# Patient Record
Sex: MALE | Race: WHITE
[De-identification: names, ages, dates, MRNs, and addresses within clinical notes are randomized per-mention and may not be internally consistent; named-entity substitution may affect disease eponyms.]

---

## 2018-07-14 ENCOUNTER — HOSPITAL ENCOUNTER (EMERGENCY)
Dept: HOSPITAL 97 - ER | Age: 48
Discharge: HOME | End: 2018-07-14
Payer: COMMERCIAL

## 2018-07-14 VITALS — TEMPERATURE: 98.8 F | OXYGEN SATURATION: 98 %

## 2018-07-14 VITALS — DIASTOLIC BLOOD PRESSURE: 68 MMHG | SYSTOLIC BLOOD PRESSURE: 103 MMHG

## 2018-07-14 DIAGNOSIS — Z88.0: ICD-10-CM

## 2018-07-14 DIAGNOSIS — F41.9: ICD-10-CM

## 2018-07-14 DIAGNOSIS — F32.9: ICD-10-CM
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
## 2018-07-14 DIAGNOSIS — S61.512A: Primary | ICD-10-CM

## 2018-07-14 DIAGNOSIS — F17.210: ICD-10-CM

## 2018-07-14 PROCEDURE — 0JQH0ZZ REPAIR LEFT LOWER ARM SUBCUTANEOUS TISSUE AND FASCIA, OPEN APPROACH: ICD-10-PCS

## 2018-07-14 PROCEDURE — 99284 EMERGENCY DEPT VISIT MOD MDM: CPT
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
## 2018-07-14 NOTE — RAD REPORT
EXAM DESCRIPTION:  RAD - Wrist Left 3 View - 7/14/2018 2:42 am

 

CLINICAL HISTORY:  Fall, laceration

 

COMPARISON:  None.

 

FINDINGS:  No fracture is identified. There is no dislocation or periosteal reaction noted. No acute 
bone or joint finding identifiable. Faint calcific densities are present on the AP and oblique views 
superimposed in the triangular fibrocartilage region. There is faint radiopaque density in the ventra
l soft tissues on the lateral view in this same location. Small foreign body cannot be excluded. Jon
elation is needed for any evidence of a soft tissue injury at the base of the hand ulna side.

 

IMPRESSION:  No fracture or acute bone or joint finding.

 

Faint radiopaque density soft tissues ventral surface near the ulna carpal bone articulation. Correla
tion is needed with any soft tissue wound in this region.

## 2018-07-22 ENCOUNTER — HOSPITAL ENCOUNTER (EMERGENCY)
Dept: HOSPITAL 97 - ER | Age: 48
Discharge: HOME | End: 2018-07-22
Payer: COMMERCIAL

## 2018-07-22 VITALS — OXYGEN SATURATION: 96 % | SYSTOLIC BLOOD PRESSURE: 104 MMHG | DIASTOLIC BLOOD PRESSURE: 73 MMHG

## 2018-07-22 VITALS — TEMPERATURE: 97.9 F

## 2018-07-22 DIAGNOSIS — R07.9: Primary | ICD-10-CM

## 2018-07-22 DIAGNOSIS — F32.9: ICD-10-CM

## 2018-07-22 DIAGNOSIS — F41.9: ICD-10-CM

## 2018-07-22 DIAGNOSIS — F17.210: ICD-10-CM

## 2018-07-22 DIAGNOSIS — Z88.0: ICD-10-CM

## 2018-07-22 LAB
ALBUMIN SERPL BCP-MCNC: 3.8 G/DL (ref 3.4–5)
ALP SERPL-CCNC: 80 U/L (ref 45–117)
ALT SERPL W P-5'-P-CCNC: 25 U/L (ref 12–78)
AST SERPL W P-5'-P-CCNC: 20 U/L (ref 15–37)
BUN BLD-MCNC: 12 MG/DL (ref 7–18)
CKMB CREATINE KINASE MB: 1 NG/ML (ref 0.3–3.6)
GLUCOSE SERPLBLD-MCNC: 89 MG/DL (ref 74–106)
HCT VFR BLD CALC: 40.8 % (ref 39.6–49)
INR BLD: 0.94
LYMPHOCYTES # SPEC AUTO: 4 K/UL (ref 0.7–4.9)
MAGNESIUM SERPL-MCNC: 2.2 MG/DL (ref 1.8–2.4)
MCH RBC QN AUTO: 32.5 PG (ref 27–35)
MCV RBC: 94.4 FL (ref 80–100)
NT-PROBNP SERPL-MCNC: 115 PG/ML (ref ?–125)
PMV BLD: 8.5 FL (ref 7.6–11.3)
POTASSIUM SERPL-SCNC: 4.2 MMOL/L (ref 3.5–5.1)
RBC # BLD: 4.32 M/UL (ref 4.33–5.43)

## 2018-07-22 PROCEDURE — 71045 X-RAY EXAM CHEST 1 VIEW: CPT

## 2018-07-22 PROCEDURE — 93005 ELECTROCARDIOGRAM TRACING: CPT

## 2018-07-22 PROCEDURE — 82550 ASSAY OF CK (CPK): CPT

## 2018-07-22 PROCEDURE — 36415 COLL VENOUS BLD VENIPUNCTURE: CPT

## 2018-07-22 PROCEDURE — 80048 BASIC METABOLIC PNL TOTAL CA: CPT

## 2018-07-22 PROCEDURE — 84484 ASSAY OF TROPONIN QUANT: CPT

## 2018-07-22 PROCEDURE — 83735 ASSAY OF MAGNESIUM: CPT

## 2018-07-22 PROCEDURE — 85610 PROTHROMBIN TIME: CPT

## 2018-07-22 PROCEDURE — 85025 COMPLETE CBC W/AUTO DIFF WBC: CPT

## 2018-07-22 PROCEDURE — 99285 EMERGENCY DEPT VISIT HI MDM: CPT

## 2018-07-22 PROCEDURE — 80076 HEPATIC FUNCTION PANEL: CPT

## 2018-07-22 PROCEDURE — 83880 ASSAY OF NATRIURETIC PEPTIDE: CPT

## 2018-07-22 PROCEDURE — 85730 THROMBOPLASTIN TIME PARTIAL: CPT

## 2018-07-22 PROCEDURE — 82553 CREATINE MB FRACTION: CPT

## 2018-07-22 PROCEDURE — 81003 URINALYSIS AUTO W/O SCOPE: CPT

## 2018-07-22 NOTE — RAD REPORT
EXAM DESCRIPTION:  RAD - Chest Single View - 7/22/2018 2:06 am

 

CLINICAL HISTORY:  Chest pain

 

COMPARISON:  March 2017, December 2016, April 2014

 

TECHNIQUE:  AP portable chest image was obtained 0150 hours .

 

FINDINGS:  Lungs are clear of an acute process. Interstitial pattern is stable. Trachea is midline. H
eart size normal range with no vascular engorgement. No measurable pleural effusion and no pneumothor
ax. Bony degenerative changes are present. In the left humeral epiphysis and metaphysis region there 
is central lucent lesion with sclerotic rim. This is not fully assessed on this study. No gross ace
e from prior imaging. No acute aortic findings suspected.

 

IMPRESSION:  No acute cardiopulmonary process.

 

The above detailed findings are not clearly different from comparison studies.

## 2018-07-22 NOTE — EDPHYS
Physician Documentation                                                                           

 [facility]                                                                

Name: Darrell Ruiz                                                                               

Age: 47 yrs                                                                                       

Sex: Male                                                                                         

: 1970                                                                                   

MRN: [id number]                                                                                   

Arrival Date: 2018                                                                          

Time: 01:15                                                                                       

Account#: [id number]                                                                            

Bed 23                                                                                            

Private MD:                                                                                       

ED Physician Norman Navas                                                                             

HPI:                                                                                              

                                                                                             

02:00 This 47 yrs old  Male presents to ER via Ambulatory with complaints of         pm1 

      Palpitations, chest pain.                                                                   

02:00 The patient presents with a history of irregular heart beat. Context: The symptoms      pm1 

      occur with anxiety, with stress. Onset: The symptoms/episode began/occurred Has had         

      chest pain and palpitations for about 5 years. Managed by PCP with Xanax. Had episode       

      of palpations 3 hours ago. Modifying factors: The symptoms are aggravated by anxiety,       

      stress, The symptoms are alleviated by prescription medication, Xanax and relaxation.       

      Associated signs and symptoms: Pertinent negatives: cough, fever, nausea, SOB,              

      vomiting. Severity of symptoms: in the emergency department the symptoms have resolved.     

      The patient has experienced similar episodes in the past, multiple times, chronically.      

02:00 Patient was concerned that his laceration repair might be getting infected. No          pm1 

      drainage, dehiscence, redness from wound repair to left hand.                               

                                                                                                  

Historical:                                                                                       

- Allergies:                                                                                      

01:29 PENICILLINS;                                                                            tl2 

- Home Meds:                                                                                      

01:29 alprazolam 2 mg Oral tab as needed [Active]; Ambien 10 mg Oral tab 1 tab once daily     tl2 

      [Active]; Duexis 800-26.6 mg Oral tab 1 tab 3 times per day [Active]; Norco  mg       

      Oral tab 1 tab every 6 hours for Pain [Active]; Phenergan Oral 25 mg as needed [Active];    

- PMHx:                                                                                           

01:29 Anxiety; Back pain; carpal tunnel; chronic back pain; Chronic pain; DEGENERATIVE DISC   tl2 

      DX; Depression; insomnia; osteoarthritis;                                                   

                                                                                                  

- Immunization history:: Adult Immunizations up to date.                                          

- Social history:: Smoking status: Patient uses tobacco products, smokes one pack                 

  cigarettes per day.                                                                             

- Ebola Screening: : No symptoms or risks identified at this time.                                

                                                                                                  

                                                                                                  

ROS:                                                                                              

02:00 Constitutional: Negative for fever, chills, and weight loss, Eyes: Negative for injury, pm1 

      pain, redness, and discharge, ENT: Negative for injury, pain, and discharge, Neck:          

      Negative for injury, pain, and swelling, Respiratory: Negative for shortness of breath,     

      cough, wheezing, and pleuritic chest pain, Abdomen/GI: Negative for abdominal pain,         

      nausea, vomiting, diarrhea, and constipation.                                               

02:00 Back: Negative for injury and pain, : Negative for injury, bleeding, discharge, and       

      swelling, MS/Extremity: Negative for injury and deformity, Skin: Negative for injury,       

      rash, and discoloration, Neuro: Negative for headache, weakness, numbness, tingling,        

      and seizure.                                                                                

02:00 Cardiovascular: Positive for chest pain, palpitations.                                      

                                                                                                  

Exam:                                                                                             

02:00 Constitutional:  This is a well developed, well nourished patient who is awake, alert,  pm1 

      and in no acute distress. Head/Face:  Normocephalic, atraumatic. Eyes:  Pupils equal        

      round and reactive to light, extra-ocular motions intact.  Lids and lashes normal.          

      Conjunctiva and sclera are non-icteric and not injected.  Cornea within normal limits.      

      Periorbital areas with no swelling, redness, or edema. ENT:  Nares patent. No nasal         

      discharge, no septal abnormalities noted.  Tympanic membranes are normal and external       

      auditory canals are clear.  Oropharynx with no redness, swelling, or masses, exudates,      

      or evidence of obstruction, uvula midline.  Mucous membranes moist. Neck:  Trachea          

      midline, no thyromegaly or masses palpated, and no cervical lymphadenopathy.  Supple,       

      full range of motion without nuchal rigidity, or vertebral point tenderness.  No            

      Meningismus. Chest/axilla:  Normal chest wall appearance and motion.  Nontender with no     

      deformity.  No lesions are appreciated. Cardiovascular:  Regular rate and rhythm with a     

      normal S1 and S2.  No gallops, murmurs, or rubs.  Normal PMI, no JVD.  No pulse             

      deficits. Respiratory:  Lungs have equal breath sounds bilaterally, clear to                

      auscultation and percussion.  No rales, rhonchi or wheezes noted.  No increased work of     

      breathing, no retractions or nasal flaring. Abdomen/GI:  Soft, non-tender, with normal      

      bowel sounds.  No distension or tympany.  No guarding or rebound.  No evidence of           

      tenderness throughout. Back:  No spinal tenderness.  No costovertebral tenderness.          

      Full range of motion. MS/ Extremity:  Pulses equal, no cyanosis.  Neurovascular intact.     

       Full, normal range of motion.                                                              

02:00 Skin: Appearance: normal except for affected area, Wound recheck: Suture laceration         

      closure: the wound is healing well, the edges are well approximated, no evidence of         

      dehiscence, no drainage, no erythema, no swelling.                                          

02:00 Neuro: Orientation: is normal, Motor: is normal, moves all fours.                           

                                                                                                  

Vital Signs:                                                                                      

01:29  / 83; Pulse 55; Resp 18; Temp 97.9(O); Pulse Ox 98% on R/A; Weight 68.95 kg;     tl2 

      Height 5 ft. 6 in. (167.64 cm); Pain 10/10;                                                 

02:25  / 73; Pulse 57; Resp 18; Pulse Ox 96% on R/A; Pain 4/10;                         mg2 

01:29 Body Mass Index 24.53 (68.95 kg, 167.64 cm)                                             tl2 

                                                                                                  

MDM:                                                                                              

01:30 Patient medically screened.                                                             pm1 

02:46 Data reviewed: vital signs. Data interpreted: Pulse oximetry: on room air is 96 %.      pm1 

      Interpretation: normal. Counseling: I had a detailed discussion with the patient and/or     

      guardian regarding: the historical points, exam findings, and any diagnostic results        

      supporting the discharge/admit diagnosis, lab results, radiology results, the need for      

      outpatient follow up, to return to the emergency department if symptoms worsen or           

      persist or if there are any questions or concerns that arise at home.                       

                                                                                                  

                                                                                             

01:34 Order name: Basic Metabolic Panel; Complete Time: 02:29                                 pm                                                                                             

01:34 Order name: CBC with Diff; Complete Time: 02:08                                         pm                                                                                             

01:34 Order name: Ckmb; Complete Time: 02:                                                                                             

01:34 Order name: CPK; Complete Time: 02:29                                                                                                                                                

01:34 Order name: LFT's; Complete Time: 02:29                                                 pm                                                                                             

01:34 Order name: Magnesium; Complete Time: 02:29                                             pm                                                                                             

01:34 Order name: NT PRO-BNP; Complete Time: 02:29                                            pm                                                                                             

01:34 Order name: PT-INR                                                                      pm                                                                                             

01:34 Order name: Ptt, Activated                                                              pm                                                                                             

01:34 Order name: Troponin (emerg Dept Use Only); Complete Time: 02:14                        pm                                                                                             

01:34 Order name: XRAY Chest (1 view)                                                         pm1 

                                                                                             

01:34 Order name: EKG; Complete Time: 01:35                                                   pm                                                                                             

01:50 Order name: Urine Dipstick--Ancillary (enter results)                                   rg2 

                                                                                             

01:34 Order name: Cardiac monitoring; Complete Time: 01:35                                    pm                                                                                             

01:34 Order name: EKG - Nurse/Tech; Complete Time: 01:35                                      pm                                                                                             

01:34 Order name: IV Saline Lock; Complete Time: 01:35                                        pm                                                                                             

01:34 Order name: Labs collected and sent; Complete Time: 01:35                               pm                                                                                             

01:34 Order name: O2 Per Protocol; Complete Time: 01:35                                       pm                                                                                             

01:34 Order name: O2 Sat Monitoring; Complete Time: 01:35                                     pm                                                                                             

01:34 Order name: Urine Dipstick-Ancillary (obtain specimen); Complete Time: 01:35            pm1 

                                                                                                  

Administered Medications:                                                                         

02:56 Drug: Ativan 1 mg Route: PO;                                                            mg2 

02:56 Follow up: Response: No adverse reaction; Medication administered at discharge.         mg2 

                                                                                                  

                                                                                                  

Disposition:                                                                                      

03:11 Co-signature as Attending Physician, Norman Navas MD.                                        sandie 

                                                                                                  

Disposition:                                                                                      

18 02:46 Discharged to Home. Impression: Chest pain, unspecified.                           

- Condition is Stable.                                                                            

- Discharge Instructions: Nonspecific Chest Pain.                                                 

                                                                                                  

- Medication Reconciliation Form, Thank You Letter, Antibiotic Education, Prescription            

  Opioid Use form.                                                                                

- Follow up: Emergency Department; When: As needed; Reason: Worsening of condition.               

  Follow up: Private Physician; When: 2 - 3 days; Reason: Recheck today's complaints,             

  Continuance of care, Re-evaluation by your physician.                                           

- Problem is new.                                                                                 

- Symptoms have improved.                                                                         

                                                                                                  

                                                                                                  

                                                                                                  

Signatures:                                                                                       

Dispatcher [employer]                           EDMS                                                 

Norman Navas MD MD   pkl                                                  

Sylvester Corral NP                    NP   pm1                                                  

Leena Okeefe, RN                        RN   tl2                                                  

Jarod Pimentel RN                    RN   mg2                                                  

                                                                                                  

Corrections: (The following items were deleted from the chart)                                    

02:59 02:46 2018 02:46 Discharged to Home. Impression: Chest pain, unspecified.         mg2 

      Condition is Stable. Forms are Medication Reconciliation Form, Thank You Letter,            

      Antibiotic Education, Prescription Opioid Use. Follow up: Emergency Department; When:       

      As needed; Reason: Worsening of condition. Follow up: Private Physician; When: 2 - 3        

      days; Reason: Recheck today's complaints, Continuance of care, Re-evaluation by your        

      physician. Problem is new. Symptoms have improved. pm1                                      

                                                                                                  

**************************************************************************************************

## 2018-07-22 NOTE — ER
Nurse's Notes                                                                                     

 [facility]                                                                

Name: Darrell Ruiz                                                                               

Age: 47 yrs                                                                                       

Sex: Male                                                                                         

: 1970                                                                                   

MRN: [id number]                                                                                   

Arrival Date: 2018                                                                          

Time: 01:15                                                                                       

Account#: [id number]                                                                            

Bed 23                                                                                            

Private MD:                                                                                       

Diagnosis: Chest pain, unspecified                                                                

                                                                                                  

Presentation:                                                                                     

                                                                                             

01:25 Presenting complaint: Patient states: I've been having chest pains for a while and my   tl2 

      doctor knows about it. I take 81 mg aspirin every day and am supposed to have a stress      

      test but haven't scheduled one. Pt reports pain is the same as before. Denies               

      dizziness, nausea or sweating. Transition of care: patient was not received from            

      another setting of care. Onset of symptoms was 2018 at 00:45. Risk Assessment:     

      Do you want to hurt yourself or someone else? Patient reports no desire to harm self or     

      others. Initial Sepsis Screen: Does the patient meet any 2 criteria? No. Patient's          

      initial sepsis screen is negative. Does the patient have a suspected source of              

      infection? No. Patient's initial sepsis screen is negative. Care prior to arrival: None.    

01:25 Method Of Arrival: Ambulatory                                                           tl2 

01:25 Acuity: MARY 3                                                                           tl2 

                                                                                                  

Triage Assessment:                                                                                

:29 General: Appears in no apparent distress. uncomfortable, Behavior is calm, cooperative, tl2 

      appropriate for age. Pain: Complains of pain in mid-sternal area Pain radiates to both      

      sides of face Pain currently is 9 out of 10 on a pain scale. Quality of pain is             

      described as sharp. Neuro: Level of Consciousness is awake, alert, obeys commands,          

      Oriented to person, place, time, situation. Cardiovascular: Chest pain is described as      

      mild, quality is sharp, is located in anterior. Respiratory: Airway is patent               

      Respiratory effort is even, unlabored, Respiratory pattern is regular, symmetrical. GI:     

      No signs and/or symptoms were reported involving the gastrointestinal system. : No        

      signs and/or symptoms were reported regarding the genitourinary system. Derm: Skin is       

      pink, warm \T\ dry.                                                                         

                                                                                                  

Historical:                                                                                       

- Allergies:                                                                                      

 PENICILLINS;                                                                            tl2 

- Home Meds:                                                                                      

 alprazolam 2 mg Oral tab as needed [Active]; Ambien 10 mg Oral tab 1 tab once daily     tl2 

      [Active]; Duexis 800-26.6 mg Oral tab 1 tab 3 times per day [Active]; Norco  mg       

      Oral tab 1 tab every 6 hours for Pain [Active]; Phenergan Oral 25 mg as needed [Active];    

- PMHx:                                                                                           

01:29 Anxiety; Back pain; carpal tunnel; chronic back pain; Chronic pain; DEGENERATIVE DISC   tl2 

      DX; Depression; insomnia; osteoarthritis;                                                   

                                                                                                  

- Immunization history:: Adult Immunizations up to date.                                          

- Social history:: Smoking status: Patient uses tobacco products, smokes one pack                 

  cigarettes per day.                                                                             

- Ebola Screening: : No symptoms or risks identified at this time.                                

                                                                                                  

                                                                                                  

Screenin:32 Abuse screen: Denies threats or abuse. Nutritional screening: No deficits noted.        tl2 

      Tuberculosis screening: No symptoms or risk factors identified. Fall Risk None              

      identified.                                                                                 

                                                                                                  

Assessment:                                                                                       

:32 General: see triage assessment.                                                         tl2 

01:57 Pain: Complains of pain in chest and mid-sternal area Pain does not radiate. Pain       mg2 

      currently is 8 out of 10 on a pain scale. Quality of pain is described as aching, Pain      

      began gradually. Neuro: Level of Consciousness is awake, alert, obeys commands,             

      Oriented to person, place, time, situation. Cardiovascular: Capillary refill < 3            

      seconds Patient's skin is warm and dry. Rhythm is sinus bradycardia Chest pain quality      

      is tightness is located in chest wall midsternal. Respiratory: Airway is patent             

      Respiratory effort is even, unlabored, Respiratory pattern is regular, symmetrical. GI:     

      No signs and/or symptoms were reported involving the gastrointestinal system. : No        

      signs and/or symptoms were reported regarding the genitourinary system. EENT: No signs      

      and/or symptoms were reported regarding the EENT system. Derm: Skin is intact, Skin is      

      pink, warm \T\ dry. normal. Musculoskeletal: Circulation, motion, and sensation intact.     

02:25 Reassessment: Patient appears in no apparent distress at this time. Patient and/or      mg2 

      family updated on plan of care and expected duration. Pain level reassessed. Patient is     

      alert, oriented x 3, equal unlabored respirations, skin warm/dry/pink.                      

                                                                                                  

Vital Signs:                                                                                      

01:29  / 83; Pulse 55; Resp 18; Temp 97.9(O); Pulse Ox 98% on R/A; Weight 68.95 kg;     tl2 

      Height 5 ft. 6 in. (167.64 cm); Pain 10/10;                                                 

02:25  / 73; Pulse 57; Resp 18; Pulse Ox 96% on R/A; Pain 4/10;                         mg2 

01:29 Body Mass Index 24.53 (68.95 kg, 167.64 cm)                                             tl2 

                                                                                                  

ED Course:                                                                                        

01:15 Patient arrived in ED.                                                                  ds1 

01:28 Triage completed.                                                                       tl2 

01:29 Arm band placed on right wrist.                                                         tl2 

01:30 Sylvester Corral NP is PHCP.                                                           pm1 

01:30 Norman Navas MD is Attending Physician.                                                    pm1 

01:32 Jarod Pimentel, CARLA is Primary Nurse.                                                  mg2 

01:32 Patient has correct armband on for positive identification. Placed in gown. Bed in low  tl2 

      position. Call light in reach. Side rails up X 1. Cardiac monitor on. Pulse ox on. NIBP     

      on.                                                                                         

01:32 Patient maintains SpO2 saturation greater than 95% on room air.                         tl2 

01:33 No provider procedures requiring assistance completed. Inserted saline lock: 20 gauge   mg2 

      in right antecubital area, using aseptic technique. Blood collected.                        

01:59 Door closed. Lights dimmed. Warm blanket given.                                         mg2 

02:04 X-ray completed. Portable x-ray completed in exam room. Patient tolerated procedure     tm4 

      well.                                                                                       

02:06 XRAY Chest (1 view) In Process Unspecified.                                             EDMS

02:56 IV discontinued, intact, bleeding controlled, No redness/swelling at site. Pressure     mg2 

      dressing applied.                                                                           

                                                                                                  

Administered Medications:                                                                         

02:56 Drug: Ativan 1 mg Route: PO;                                                            mg2 

02:56 Follow up: Response: No adverse reaction; Medication administered at discharge.         mg2 

                                                                                                  

                                                                                                  

Outcome:                                                                                          

02:46 Discharge ordered by MD.                                                                pm1 

02:57 Discharged to home ambulatory, with family.                                             mg2 

02:57 Condition: stable                                                                           

02:57 Discharge instructions given to patient, family, Instructed on discharge instructions,      

      follow up and referral plans. Demonstrated understanding of instructions, follow-up         

      care.                                                                                       

02:59 Patient left the ED.                                                                    mg2 

                                                                                                  

Signatures:                                                                                       

Dispatcher [employer]                           EDMS                                                 

Gonzalez, Christie                             tm4                                                  

Sharon Young                                ds1                                                  

Sylvester Corral NP                    NP   pm1                                                  

Leena Okeefe RN                        RN   tl2                                                  

Jarod Pimentel RN                    RN   mg2                                                  

                                                                                                  

**************************************************************************************************

## 2018-07-23 NOTE — EKG
Test Date:    2018-07-22               Test Time:    01:23:55

Technician:   RAMON                                     

                                                     

MEASUREMENT RESULTS:                                       

Intervals:                                           

Rate:         52                                     

DE:           150                                    

QRSD:         80                                     

QT:           424                                    

QTc:          394                                    

Axis:                                                

P:            52                                     

DE:           150                                    

QRS:          65                                     

T:            47                                     

                                                     

INTERPRETIVE STATEMENTS:                                       

                                                     

Sinus bradycardia

Otherwise normal ECG

Compared to ECG 03/05/2017 21:39:13

Sinus rhythm no longer present



Electronically Signed On 07-23-18 07:43:00 CDT by Clark Burdick

## 2019-02-21 ENCOUNTER — HOSPITAL ENCOUNTER (EMERGENCY)
Dept: HOSPITAL 97 - ER | Age: 49
LOS: 1 days | Discharge: HOME | End: 2019-02-22
Payer: COMMERCIAL

## 2019-02-21 DIAGNOSIS — F41.9: ICD-10-CM

## 2019-02-21 DIAGNOSIS — Z88.0: ICD-10-CM

## 2019-02-21 DIAGNOSIS — F32.9: ICD-10-CM

## 2019-02-21 DIAGNOSIS — M54.5: Primary | ICD-10-CM

## 2019-02-21 DIAGNOSIS — F17.210: ICD-10-CM

## 2019-02-21 PROCEDURE — 99283 EMERGENCY DEPT VISIT LOW MDM: CPT

## 2019-02-21 PROCEDURE — 96372 THER/PROPH/DIAG INJ SC/IM: CPT

## 2019-02-21 PROCEDURE — 81003 URINALYSIS AUTO W/O SCOPE: CPT

## 2019-02-21 NOTE — EDPHYS
Physician Documentation                                                                           

 [facility]                                                                

Name: Darrell Ruiz                                                                               

Age: 48 yrs                                                                                       

Sex: Male                                                                                         

: 1970                                                                                   

MRN: [id number]                                                                                   

Arrival Date: 2019                                                                          

Time: 19:23                                                                                       

Account#: [id number]                                                                            

Bed 8                                                                                             

Private MD: Esau Palacios E                                                                     

ED Physician Leonard Oliver                                                                       

HPI:                                                                                              

                                                                                             

22:30 This 48 yrs old  Male presents to ER via Wheelchair with complaints of Back    cp  

      Pain.                                                                                       

22:30 The patient presents with pain that is chronic, stiffness. The symptoms are located in  cp  

      the low back. Onset: The symptoms/episode began/occurred and became worse yesterday.        

      Associated signs and symptoms: Pertinent positives: diarrhea, Pertinent negatives:          

      abdominal pain, chest pain, constipation, fever, incontinence, numbness, urinary            

      retention, vomiting, weakness. Modifying factors: the patient symptoms are aggravated       

      by movement, walking. Severity of symptoms: in the emergency department the symptoms        

      are unchanged, despite home interventions.                                                  

                                                                                                  

Historical:                                                                                       

- Allergies:                                                                                      

20:02 PENICILLINS;                                                                            ea  

- Home Meds:                                                                                      

20:02 alprazolam 2 mg Oral tab as needed [Active]; Ambien 10 mg Oral tab 1 tab once daily     ea  

      [Active]; Duexis 800-26.6 mg Oral tab 1 tab 3 times per day [Active]; Norco  mg       

      Oral tab 1 tab every 6 hours for Pain [Active]; Phenergan Oral 25 mg as needed [Active];    

- PMHx:                                                                                           

20:02 Anxiety; Back pain; carpal tunnel; chronic back pain; Chronic pain; DEGENERATIVE DISC   ea  

      DX; Depression; insomnia; osteoarthritis;                                                   

                                                                                                  

- Immunization history:: Adult Immunizations up to date.                                          

- Social history:: Smoking status: Patient uses tobacco products, smokes one-half pack            

  cigarettes per day.                                                                             

- Ebola Screening: : No symptoms or risks identified at this time.                                

                                                                                                  

                                                                                                  

ROS:                                                                                              

22:35 Back: Positive for pain with movement, Negative for injury or acute deformity, radiated cp  

      pain.                                                                                       

22:35 Eyes: Negative for injury, pain, redness, and discharge.                                cp  

22:35 Constitutional: Negative for body aches, chills, fever, poor PO intake.                     

22:35 Respiratory: Negative for cough, shortness of breath, wheezing.                             

22:35 Abdomen/GI: Positive for diarrhea, Negative for abdominal pain, nausea, vomiting, and       

      diarrhea, vomiting, constipation, bowel incontinence.                                       

22:35 : Negative for urinary symptoms, difficulty urinating, bladder incontinence,              

      testicular pain                                                                             

22:35 MS/extremity: Negative for injury or acute deformity, decreased range of motion,            

      paresthesias.                                                                               

22:35 Neuro: Negative for altered mental status, headache, numbness, weakness.                    

22:35 All other systems are negative.                                                             

                                                                                                  

Exam:                                                                                             

22:42 Constitutional: The patient appears in no acute distress, alert, awake, non-toxic, well cp  

      developed, well nourished, uncomfortable.                                                   

22:42 Head/Face:  Normocephalic, atraumatic.                                                  cp  

22:42 Eyes: Periorbital structures: appear normal, Conjunctiva: normal, no exudate, no            

      injection, Lids and lashes: appear normal, bilaterally.                                     

22:42 ENT: External ear(s): are unremarkable, Nose: is normal, Mouth: Lips: moist, Oral           

      mucosa: moist, Posterior pharynx: Airway: no evidence of obstruction, patent.               

22:42 Neck: ROM/movement: is normal, is supple, without pain, no range of motions                 

      limitations, no nuchal rigidity.                                                            

22:42 Chest/axilla: Inspection: normal, Palpation: is normal, no crepitus, no tenderness.         

22:42 Cardiovascular: Rate: normal, Rhythm: regular, Edema: is not appreciated, JVD: is not       

      appreciated.                                                                                

22:42 Respiratory: the patient does not display signs of respiratory distress,  Respirations:     

      normal, no use of accessory muscles, no retractions, no splinting, no tachypnea.            

22:42 Abdomen/GI: Exam negative for discomfort, distension, guarding, Inspection: abdomen         

      appears normal.                                                                             

22:42 Back: pain, that is moderate, of the  lumbar area, ROM is painful, with all movement,       

      Straight leg raises: of both lower extremities does not illicit pain.                       

22:42 Skin: cellulitis, is not appreciated, no rash present.                                      

22:42 Neuro: Orientation: to person, place \T\ time. Mentation: is normal, Motor: moves all       

      fours, strength is normal, Sensation: is normal, Deep tendon reflexes are 2+ (normal)       

      in the  right patellar, right Achilles, left patellar and left Achilles.                    

                                                                                                  

Vital Signs:                                                                                      

20:04  / 82; Pulse 94; Resp 16; Temp 98.5; Pulse Ox 98% on R/A; Weight 68.04 kg; Height ea  

      5 ft. 6 in. (167.64 cm); Pain 10/10;                                                        

21:00  / 76; Pulse 90; Resp 15; Pulse Ox 99% ;                                          rr5 

22:00  / 92; Pulse 71; Resp 16; Pulse Ox 99% ;                                          rr5 

23:00  / 86; Pulse 71; Resp 17; Pulse Ox 98% ;                                          rr5 

                                                                                             

00:00  / 70; Pulse 70; Resp 18; Pulse Ox 98% ;                                          rr5 

                                                                                             

20:04 Body Mass Index 24.21 (68.04 kg, 167.64 cm)                                             ea  

                                                                                                  

MDM:                                                                                              

                                                                                             

22:03 Patient medically screened.                                                             cp  

23:00 Differential diagnosis: ruptured disc, sciatica, spinal stenosis, cauda equina.         cp  

23:43 Data reviewed: vital signs, nurses notes.                                               cp  

23:43 Counseling: I had a detailed discussion with the patient and/or guardian regarding: the cp  

      historical points, exam findings, and any diagnostic results supporting the                 

      discharge/admit diagnosis, the need for outpatient follow up, a pain management             

      specialist, to return to the emergency department if symptoms worsen or persist or if       

      there are any questions or concerns that arise at home. Response to treatment:              

      improved, and as a result, I will discharge patient.                                        

                                                                                                  

                                                                                             

22:58 Order name: Urine Dipstick--Ancillary (enter results)                                   mt  

                                                                                             

23:40 Order name: Urine Dipstick-Ancillary                                                    [facility]

                                                                                             

22:14 Order name: Urine Dipstick-Ancillary (obtain specimen); Complete Time: 22:54            cp  

                                                                                                  

Administered Medications:                                                                         

22:35 Drug: HYDROcodone-acetaminophen 10 mg-325 mg 1 tabs Route: PO;                          rr5 

                                                                                             

00:18 Follow up: Response: No adverse reaction                                                rr5 

                                                                                             

22:36 Drug: Diazepam 5 mg Route: IM; Site: left deltoid;                                      rr5 

                                                                                             

00:18 Follow up: Response: No adverse reaction                                                rr5 

                                                                                             

22:37 Drug: TORadol 60 mg Route: IM; Site: right gluteus;                                     rr5 

                                                                                             

00:18 Follow up: Response: No adverse reaction                                                rr5 

                                                                                             

23:45 Drug: morphine 4 mg Route: IM; Site: left gluteus;                                      rr5 

                                                                                             

00:18 Follow up: Response: No adverse reaction                                                rr5 

                                                                                                  

                                                                                                  

Disposition:                                                                                      

20:11 Co-signature as Attending Physician, Leonard Oliver MD.                                  shashank  

                                                                                                  

Disposition:                                                                                      

19 23:44 Discharged to Home. Impression: Low back pain.                                     

- Condition is Stable.                                                                            

- Discharge Instructions: Back Pain, Adult.                                                       

- Prescriptions for Baclofen 10 mg Oral Tablet - take 1 tablet by ORAL route 3 times              

  per day; 20 tablet. Medrol (Lui) 4 mg Oral Tablets, Dose Pack - take 1 tablet by ORAL           

  route as directed - follow package instructions; 1 packet.                                      

- Medication Reconciliation Form, Thank You Letter, Antibiotic Education, Prescription            

  Opioid Use form.                                                                                

- Follow up: Private Physician; When: 2 - 3 days; Reason: Recheck today's complaints.             

- Problem is an acute exacerbation.                                                               

- Symptoms have improved.                                                                         

                                                                                                  

                                                                                                  

                                                                                                  

Signatures:                                                                                       

Dispatcher [employer]                           EDMS                                                 

Juan Machado PA PA cp Antunez, Elena, RN                      Leonard Klein ea, MD MD gs Roque, Raymond RN                      RN   rr5                                                  

                                                                                                  

Corrections: (The following items were deleted from the chart)                                    

00:18  23:44 2019 23:44 Discharged to Home. Impression: Low back pain. Condition   rr5 

      is Stable. Forms are Medication Reconciliation Form, Thank You Letter, Antibiotic           

      Education, Prescription Opioid Use. Follow up: Private Physician; When: 2 - 3 days;         

      Reason: Recheck today's complaints. Problem is an acute exacerbation. Symptoms have         

      improved. kat                                                                                

                                                                                                  

**************************************************************************************************

## 2019-02-21 NOTE — ER
Nurse's Notes                                                                                     

 [facility]                                                                

Name: Darrell Ruiz                                                                               

Age: 48 yrs                                                                                       

Sex: Male                                                                                         

: 1970                                                                                   

MRN: [id number]                                                                                   

Arrival Date: 2019                                                                          

Time: 19:23                                                                                       

Account#: [id number]                                                                            

Bed 8                                                                                             

Private MD: Esau Palacios E                                                                     

Diagnosis: Low back pain                                                                          

                                                                                                  

Presentation:                                                                                     

                                                                                             

19:59 Presenting complaint: Patient states: Pt reports he was scheduled with Dr. Hahn in     [facility] for pain pump, pt reports "my back has been locked since yesterday". Transition     

      of care: patient was not received from another setting of care. Onset of symptoms was       

      2019. Risk Assessment: Do you want to hurt yourself or someone else?           

      Patient reports no desire to harm self or others. Initial Sepsis Screen: Does the           

      patient meet any 2 criteria? No. Patient's initial sepsis screen is negative. Does the      

      patient have a suspected source of infection? No. Patient's initial sepsis screen is        

      negative. Care prior to arrival: Medication(s) given: Norco.                                

19:59 Method Of Arrival: Wheelchair                                                           ea  

19:59 Acuity: MARY 3                                                                             

                                                                                                  

Triage Assessment:                                                                                

20:03 General: Appears uncomfortable, Behavior is calm, cooperative, appropriate for age.     ea  

      Pain: Complains of pain in back. Neuro: Level of Consciousness is awake, alert, obeys       

      commands, Oriented to person, place, time, situation. Cardiovascular: Patient's skin is     

      warm and dry. Respiratory: Airway is patent Respiratory effort is even, unlabored,          

      Respiratory pattern is regular, symmetrical. Derm: Skin is pink, warm \T\ dry.              

      Musculoskeletal: Circulation, motion, and sensation intact. Reports pain in back.           

                                                                                                  

Historical:                                                                                       

- Allergies:                                                                                      

20:02 PENICILLINS;                                                                            ea  

- Home Meds:                                                                                      

20:02 alprazolam 2 mg Oral tab as needed [Active]; Ambien 10 mg Oral tab 1 tab once daily     ea  

      [Active]; Duexis 800-26.6 mg Oral tab 1 tab 3 times per day [Active]; Norco  mg       

      Oral tab 1 tab every 6 hours for Pain [Active]; Phenergan Oral 25 mg as needed [Active];    

- PMHx:                                                                                           

20:02 Anxiety; Back pain; carpal tunnel; chronic back pain; Chronic pain; DEGENERATIVE DISC   ea  

      DX; Depression; insomnia; osteoarthritis;                                                   

                                                                                                  

- Immunization history:: Adult Immunizations up to date.                                          

- Social history:: Smoking status: Patient uses tobacco products, smokes one-half pack            

  cigarettes per day.                                                                             

- Ebola Screening: : No symptoms or risks identified at this time.                                

                                                                                                  

                                                                                                  

Screenin:59 Abuse screen: Denies threats or abuse. Denies injuries from another. Nutritional        rr5 

      screening: No deficits noted. Tuberculosis screening: No symptoms or risk factors           

      identified. Fall Risk Ambulatory Aid- None/Bed Rest/Nurse Assist (0 pts). Gait-             

      Impaired (20 pts.). Total Serra Fall Scale indicates No Risk (0-24 pts).                    

                                                                                                  

Assessment:                                                                                       

22:00 General: Appears in no apparent distress. uncomfortable, Behavior is calm, cooperative, rr5 

      appropriate for age. Pain: Complains of pain in back Pain does not radiate. Pain            

      currently is 10 out of 10 on a pain scale. Quality of pain is described as aching, Pain     

      began gradually, Is intermittent.                                                           

22:00 Neuro: Level of Consciousness is awake, alert, obeys commands, Oriented to person,      rr5 

      place, time, situation, Appropriate for age. Cardiovascular: Capillary refill < 3           

      seconds Patient's skin is warm and dry. Respiratory: Airway is patent Respiratory           

      effort is even, unlabored, Respiratory pattern is regular, symmetrical. GI: No signs        

      and/or symptoms were reported involving the gastrointestinal system. : No signs           

      and/or symptoms were reported regarding the genitourinary system. EENT: No signs and/or     

      symptoms were reported regarding the EENT system. Derm: Skin is intact, Skin                

      temperature is warm. Musculoskeletal: Capillary refill < 3 seconds, Range of motion:        

      intact in right shoulder and back wearing back brace Reports pain in back.                  

23:00 Reassessment: Patient appears in no apparent distress at this time. No changes from     rr5 

      previously documented assessment.                                                           

                                                                                             

00:10 Reassessment: Patient appears in no apparent distress at this time. discharge           rr5 

      instruction given and explained. demonstrated understanding. assisted on ambulation         

      refused for wheelchair able to walk on the corridor going to exit door.                     

                                                                                                  

Vital Signs:                                                                                      

                                                                                             

20:04  / 82; Pulse 94; Resp 16; Temp 98.5; Pulse Ox 98% on R/A; Weight 68.04 kg; Height ea  

      5 ft. 6 in. (167.64 cm); Pain 10/10;                                                        

21:00  / 76; Pulse 90; Resp 15; Pulse Ox 99% ;                                          rr5 

22:00  / 92; Pulse 71; Resp 16; Pulse Ox 99% ;                                          rr5 

23:00  / 86; Pulse 71; Resp 17; Pulse Ox 98% ;                                          rr5 

                                                                                             

00:00  / 70; Pulse 70; Resp 18; Pulse Ox 98% ;                                          rr5 

                                                                                             

20:04 Body Mass Index 24.21 (68.04 kg, 167.64 cm)                                             ea  

                                                                                                  

ED Course:                                                                                        

                                                                                             

19:23 Patient arrived in ED.                                                                  mr  

19:24 Esau Palacios MD is Private Physician.                                                mr  

20:01 Triage completed.                                                                       ea  

20:05 Arm band placed on left wrist. Patient placed in waiting room, in a wheelchair.         ea  

21:51 Elvis Hicks, RN is Primary Nurse.                                                    rr5 

22:00 Patient has correct armband on for positive identification. Placed in gown. Bed in low  rr5 

      position. Call light in reach. Side rails up X2. Pulse ox on. NIBP on.                      

22:02 Juan Machado PA is PHCP.                                                                cp  

22:02 Leonard Oliver MD is Attending Physician.                                              cp  

23:34 No provider procedures requiring assistance completed.                                  rr5 

                                                                                             

00:17 Patient did not have IV access during this emergency room visit.                        rr5 

                                                                                                  

Administered Medications:                                                                         

                                                                                             

22:35 Drug: HYDROcodone-acetaminophen 10 mg-325 mg 1 tabs Route: PO;                          rr5 

                                                                                             

00:18 Follow up: Response: No adverse reaction                                                rr5 

                                                                                             

22:36 Drug: Diazepam 5 mg Route: IM; Site: left deltoid;                                      rr5 

                                                                                             

00:18 Follow up: Response: No adverse reaction                                                rr5 

                                                                                             

22:37 Drug: TORadol 60 mg Route: IM; Site: right gluteus;                                     rr5 

                                                                                             

00:18 Follow up: Response: No adverse reaction                                                rr5 

                                                                                             

23:45 Drug: morphine 4 mg Route: IM; Site: left gluteus;                                      rr5 

                                                                                             

00:18 Follow up: Response: No adverse reaction                                                rr5 

                                                                                                  

                                                                                                  

Outcome:                                                                                          

                                                                                             

23:44 Discharge ordered by MD.                                                                cp  

                                                                                             

00:17 Discharged to home ambulatory, with family.                                             rr5 

      Condition: stable                                                                           

      Discharge instructions given to patient, Instructed on discharge instructions, follow       

      up and referral plans. medication usage, Demonstrated understanding of instructions,        

      follow-up care, medications, Prescriptions given X 2.                                       

00:18 Patient left the ED.                                                                    rr5 

                                                                                                  

Signatures:                                                                                       

Franchesca Wan Corey, PA PA cp Antunez, Elena RN                      RN   Elvis Caceres RN                      RN   rr5                                                  

                                                                                                  

**************************************************************************************************

## 2019-02-22 VITALS — OXYGEN SATURATION: 98 %

## 2019-02-22 VITALS — SYSTOLIC BLOOD PRESSURE: 124 MMHG | DIASTOLIC BLOOD PRESSURE: 70 MMHG

## 2019-02-22 VITALS — TEMPERATURE: 98.5 F

## 2019-07-18 ENCOUNTER — HOSPITAL ENCOUNTER (EMERGENCY)
Dept: HOSPITAL 97 - ER | Age: 49
Discharge: HOME | End: 2019-07-18
Payer: COMMERCIAL

## 2019-07-18 VITALS — OXYGEN SATURATION: 98 % | SYSTOLIC BLOOD PRESSURE: 117 MMHG | DIASTOLIC BLOOD PRESSURE: 98 MMHG

## 2019-07-18 VITALS — TEMPERATURE: 98.2 F

## 2019-07-18 DIAGNOSIS — Z13.9: Primary | ICD-10-CM

## 2019-07-18 DIAGNOSIS — F32.9: ICD-10-CM

## 2019-07-18 DIAGNOSIS — F41.9: ICD-10-CM

## 2019-07-18 DIAGNOSIS — Z88.0: ICD-10-CM

## 2019-07-18 DIAGNOSIS — H92.09: ICD-10-CM

## 2019-07-18 PROCEDURE — 99281 EMR DPT VST MAYX REQ PHY/QHP: CPT

## 2019-07-18 NOTE — XMS REPORT
Patient Summary Document

 Created on:2019



Patient:REBECCA DUPONT

Sex:Male

:1970

External Reference #:[id number]





Demographics







 Address  [address] [place] [address]

 

 Home Phone  [phone]

 

 Work Phone  [phone]

 

 Email Address  NONE

 

 Preferred Language  Unknown

 

 Marital Status  Unknown

 

 Shinto Affiliation  Unknown

 

 Race  Unknown

 

 Additional Race(s)  Unavailable

 

 Ethnic Group  Unknown









Author







 Organization  [facility]

 

 Address  [facility] [address] [place] [address]

 

 Phone  [phone]









Care Team Providers







 Name  Role  Phone

 

 Unavailable  Unavailable  Unavailable









Problems

This patient has no known problems.



Allergies, Adverse Reactions, Alerts

This patient has no known allergies or adverse reactions.



Medications

This patient has no known medications.
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
## 2024-10-15 ENCOUNTER — HOSPITAL ENCOUNTER (EMERGENCY)
Dept: HOSPITAL 97 - ER | Age: 54
Discharge: HOME | End: 2024-10-15
Payer: COMMERCIAL

## 2024-10-15 VITALS — SYSTOLIC BLOOD PRESSURE: 117 MMHG | TEMPERATURE: 97.3 F | OXYGEN SATURATION: 94 % | DIASTOLIC BLOOD PRESSURE: 74 MMHG

## 2024-10-15 DIAGNOSIS — F17.210: ICD-10-CM

## 2024-10-15 DIAGNOSIS — S61.222A: Primary | ICD-10-CM

## 2024-10-15 PROCEDURE — 10120 INC&RMVL FB SUBQ TISS SMPL: CPT

## 2024-10-15 PROCEDURE — 99284 EMERGENCY DEPT VISIT MOD MDM: CPT

## 2024-10-15 PROCEDURE — 73130 X-RAY EXAM OF HAND: CPT

## 2024-10-15 PROCEDURE — 96372 THER/PROPH/DIAG INJ SC/IM: CPT

## 2024-10-15 NOTE — RAD REPORT
PROCEDURE:



XR Right Hand Complete, 3 Views



CLINICAL INDICATION:



The patient is 54 years old and is Male; Foreign body.



TECHNIQUE:



Frontal, lateral and oblique views of the right hand.



COMPARISON:



None.



FINDINGS:



BONES/JOINTS:   Remote fracture of the right ulnar styloid.



        Suspected remote impacted fracture of the distal right radial metaphysis. Correlation with po
int tenderness recommended.



        Persistent mild flexion of the right 5th PIP joint, uncertain if intentional or pathologic. C
linical correlation recommended.



No periarticular erosions or osteophytosis.



        No definite acute fracture.



        No subluxation or dislocation.



SOFT TISSUES:   Relative soft tissue swelling demonstrated throughout the right 2nd and 3rd digits, w
ith lesser involvement of the right 4th digit, suspicious for dactylitis.



        No appreciable radiopaque foreign body.



No subcutaneous emphysema to suggest a gas-forming infectious process or penetrating injury.



IMPRESSION:      



1.   Relative soft tissue swelling demonstrated throughout the right 2nd and 3rd digits, with lesser 
involvement of the right 4th digit, suspicious for dactylitis. No appreciable radiopaque foreign

body. Clinical correlation recommended.



2.   Remote fracture of the right ulnar styloid. Suspected remote impacted fracture of the distal rig
ht radial metaphysis. Correlation with point tenderness recommended.



3.   Persistent mild flexion of the right 5th PIP joint, uncertain if intentional or pathologic. Clin
ical correlation recommended.



Electronically signed by:   Timothy Oliveira MD   10/15/2024 05:35 AM CDT  Workstation: [id number]



Due to temporary technical issues with the [employer] reporting system, reports are being charles
d by the in-house radiologist without review as a courtesy to ensure prompt reporting the

interpreting radiologist is fully responsible for the content of the report.



Transcribed Date/Time: 10/15/2024 6:17 AM



Reported By: Jason Ambrocio 

Electronically Signed:  10/17/2024 11:21 AM

## 2024-10-15 NOTE — EDPHYS
Physician Documentation                                                                           

 [facility]                                                                 

Name: Darrell Ruiz                                                                               

Age: 54 yrs                                                                                       

Sex: Male                                                                                         

: 1970                                                                                   

MRN: [id number]                                                                                   

Arrival Date: 10/15/2024                                                                          

Time: 02:30                                                                                       

Account#: [id number]                                                                            

Bed 5                                                                                             

Private MD:                                                                                       

ED Physician Juan Dale                                                                      

HPI:                                                                                              

10/15                                                                                             

04:04 This 54 yrs old  Male presents to ER via Ambulatory with complaints of Finger  rekha 

      Injury.                                                                                     

04:04 Trauma demographics: County: The injury occurred in [place]. Associated injuries: The  rekha 

      patient sustained no obvious injury. Onset: The symptoms/episode began/occurred just        

      prior to arrival. The patient has not experienced similar symptoms in the past.             

                                                                                                  

Historical:                                                                                       

- Allergies:                                                                                      

03:16 PENICILLINS;                                                                            vc1 

- PMHx:                                                                                           

03:16 Anxiety; Back pain; carpal tunnel; chronic back pain; Chronic pain; DEGENERATIVE DISC   vc1 

      DX; Depression; insomnia; osteoarthritis; high cholesterol;                                 

- PSHx:                                                                                           

03:16 None;                                                                                   vc1 

                                                                                                  

- Immunization history:: Adult Immunizations up to date, Last tetanus immunization: <             

  10 years ago.                                                                                   

- Infectious Disease History:: Denies.                                                            

- Social history:: Smoking status: Patient reports the use of cigarette tobacco                   

  products, smokes two packs cigarettes per day.                                                  

                                                                                                  

                                                                                                  

ROS:                                                                                              

04:05 Constitutional: Negative for fever, chills, and weight loss, Eyes: Negative for injury, rekha 

      pain, redness, and discharge, ENT: Negative for injury, pain, and discharge, Neck:          

      Negative for injury, pain, and swelling, Cardiovascular: Negative for chest pain,           

      palpitations, and edema, Respiratory: Negative for shortness of breath, cough,              

      wheezing, and pleuritic chest pain, Abdomen/GI: Negative for abdominal pain, nausea,        

      vomiting, diarrhea, and constipation, Back: Negative for injury and pain, : Negative      

      for injury, bleeding, discharge, and swelling, Skin: Negative for injury, rash, and         

      discoloration, Neuro: Negative for headache, weakness, numbness, tingling, and seizure,     

      Psych: Negative for depression, anxiety, suicide ideation, homicidal ideation, and          

      hallucinations, Allergy/Immunology: Negative for hives, rash, and allergies, Endocrine:     

      Negative for neck swelling, polydipsia, polyuria, polyphagia, and marked weight             

      changes, Hematologic/Lymphatic: Negative for swollen nodes, abnormal bleeding, and          

      unusual bruising,                                                                           

04:05 MS/extremity: Positive for decreased range of motion, pain, swelling, tenderness, of        

      the right middle fingernail,                                                                

                                                                                                  

Exam:                                                                                             

04:05 Constitutional:  This is a well developed, well nourished patient who is awake, alert,  rekha 

      and in no acute distress. Head/Face:  Normocephalic, atraumatic. Eyes:  Pupils equal        

      round and reactive to light, extra-ocular motions intact.  Lids and lashes normal.          

      Conjunctiva and sclera are non-icteric and not injected.  Cornea within normal limits.      

      Periorbital areas with no swelling, redness, or edema. ENT:  Nares patent. No nasal         

      discharge, no septal abnormalities noted.  Tympanic membranes are normal and external       

      auditory canals are clear.  Oropharynx with no redness, swelling, or masses, exudates,      

      or evidence of obstruction, uvula midline.  Mucous membranes moist. Neck:  Trachea          

      midline, no thyromegaly or masses palpated, and no cervical lymphadenopathy.  Supple,       

      full range of motion without nuchal rigidity, or vertebral point tenderness.  No            

      Meningismus. Chest/axilla:  Normal chest wall appearance and motion.  Nontender with no     

      deformity.  No lesions are appreciated. Cardiovascular:  Regular rate and rhythm with a     

      normal S1 and S2.  No gallops, murmurs, or rubs.  Normal PMI, no JVD.  No pulse             

      deficits. Respiratory:  Lungs have equal breath sounds bilaterally, clear to                

      auscultation and percussion.  No rales, rhonchi or wheezes noted.  No increased work of     

      breathing, no retractions or nasal flaring. Abdomen/GI:  Soft, non-tender, with normal      

      bowel sounds.  No distension or tympany.  No guarding or rebound.  No evidence of           

      tenderness throughout. Back:  No spinal tenderness.  No costovertebral tenderness.          

      Full range of motion. Male :  Normal genitalia with no discharge or lesions. Skin:        

      Warm, dry with normal turgor.  Normal color with no rashes, no lesions, and no evidence     

      of cellulitis. Neuro:  Awake and alert, GCS 15, oriented to person, place, time, and        

      situation.  Cranial nerves II-XII grossly intact.  Motor strength 5/5 in all                

      extremities.  Sensory grossly intact.  Cerebellar exam normal.  Normal gait. Psych:         

      Awake, alert, with orientation to person, place and time.  Behavior, mood, and affect       

      are within normal limits.                                                                   

04:05 Musculoskeletal/extremity: ROM: intact in all extremities, full active range of motion,     

      full passive range of motion, limited active range of motion due to pain, limited           

      passive range of motion due to pain, Circulation is intact in all extremities.              

      Sensation intact. Compartment Syndrome exam of affected extremity: is normal. Weight        

      bearing: able to fully bear weight, DVT Exam: negative Homans' sign noted on exam, no       

      appreciated bluish discoloration, no erythema, no increased warmth, pain, swelling,         

      tenderness,                                                                                 

                                                                                                  

Vital Signs:                                                                                      

03:14  / 76; Pulse 77; Resp 18; Temp 97.7; Pulse Ox 92% ; Weight 75.3 kg; Height 5 ft.  vc1 

      6 in. ; Pain 10/10;                                                                         

04:20  / 74; Pulse 79; Resp 16 S; Temp 97.3(O); Pulse Ox 94% on R/A;                    lg3 

03:14 Body Mass Index 26.79 (75.30 kg, 167.64 cm)                                             vc1 

03:14 Pain Scale: Adult                                                                       vc1 

                                                                                                  

Procedures:                                                                                       

04:06 I \T\ D: Incision and drainage was performed for an abscess of the right right middle     rekha

      finger Prepped with Betadine, Anesthetized with 5 ml's 1% Lidocaine. 5 ml bupivicaine.      

      Foreign Body Removal: sliver of wood, from the right right middle finger. Foreign Body      

      Removal: by incising to remove, Dressinx4s were used to dress the wound, The            

      patient tolerated the removal well.                                                         

                                                                                                  

MDM:                                                                                              

02:37 Medical Screening Exam initiated                                                        rekha 

04:06 Data reviewed: vital signs, nurses notes, radiologic studies, plain films.              [facility] 

      Consideration of Admission/Observation Escalation of care including                         

      admission/observation considered. I considered the following discharge prescriptions or     

      medication management in the emergency department Medications were administered in the      

      Emergency Department. See MAR.                                                              

                                                                                                  

10/15                                                                                             

03:05 Order name: XRAY Hand RIGHT 3 View                                                      lg3 

10/15                                                                                             

03:04 Order name: Dressing - Wound; Complete Time: 03:48                                      lg3 

10/15                                                                                             

03:04 Order name: Gloves, Sterile; Complete Time: 03:48                                       lg3 

10/15                                                                                             

03:04 Order name: Setup Suture Tray; Complete Time: 03:48                                     lg3 

                                                                                                  

Administered Medications:                                                                         

03:48 Drug: Bupivacaine Infiltration (0.5 %) 10 ml 10 ml Infiltration once Volume: 10 ml;     lg3 

      Route: Infiltration;                                                                        

04:15 Follow up: Response: No adverse reaction                                                lg3 

03:48 Drug: Lidocaine Infiltration (1 %) 5 ml 5 ml Infiltration once; to bedside Volume: 5    lg3 

      ml; Route: Infiltration;                                                                    

04:15 Follow up: Response: No adverse reaction                                                lg3 

04:06 Drug: Trimethoprim-Sulfamethoxazole PO (160 mg-800 mg (DS) 1 tablet PO once Route: PO;  lg3 

04:14 Follow up: Response: No adverse reaction                                                lg3 

04:06 Drug: Doxycycline  mg PO once Route: PO;                                          lg3 

04:14 Follow up: Response: No adverse reaction                                                lg3 

04:07 Drug: Boostrix Tdap IM 0.5 ml IM once; as a single dose Route: IM; Site: right deltoid; lg3 

04:14 Follow up: Response: (VIS) Vaccine information sheet provided today. Questions and/or   lg3 

      concerns addressed. VIS edition date: Aug 6, 2021.; No adverse reaction                     

04:14 Drug: Mupirocin Topical Ointment 2 % 1 application Topical once Route: Topical; Site:   lg3 

      affected area;                                                                              

04:14 Follow up: Response: No adverse reaction                                                lg3 

                                                                                                  

                                                                                                  

Disposition Summary:                                                                              

10/15/24 04:09                                                                                    

Discharge Ordered                                                                                 

 Notes:       Location: Home                                                                        
  rekha

      Problem: new                                                                            rekha 

      Symptoms: have improved                                                                 rekha 

      Condition: Stable                                                                       rekha 

      Diagnosis                                                                                   

        - Laceration with foreign body of right hand, initial encounter - right middle        rekha 

      finger, removed                                                                             

      Followup:                                                                               rekha 

        - With: Private Physician                                                                  

        - When: 2 - 3 days                                                                         

        - Reason: Recheck today's complaints, Continuance of care, Re-evaluation by your           

      physician                                                                                   

      Discharge Instructions:                                                                     

        - Discharge Summary Sheet                                                             rekha 

        - Laceration Care, Adult                                                              rekha 

        - Puncture Wound                                                                      rekha 

        - Laceration Care, Adult, Easy-to-Read                                                rekha 

        - Puncture Wound, Easy-to-Read                                                        rekha 

        - Foreign Body                                                                        [facility] 

      Forms:                                                                                      

        - Medication Reconciliation Form                                                      [facility] 

        - Antibiotic Education                                                                rekha 

        - Prescription Opioid Use                                                             rekha 

        - Patient Portal Instructions                                                         [facility] 

        - Leadership Thank You Letter                                                         [facility] 

      Prescriptions:                                                                              

        - Centany 2 % Topical ointment                                                             

            - apply 1 application TOPICAL route 3 times per day; 15 application; Refills: 0,  rekha 

      Product Selection Permitted                                                                 

        - acetaminophen-codeine 300-30 mg Oral tablet                                              

            - take 2 tablet ORAL route 4 times per day; 15 tablet; Refills: 0, Product        rekha 

      Selection Permitted                                                                         

        - Doxycycline Hyclate 100 mg Oral tablet                                                   

            - take 1 tablet ORAL route every 12 hours; 14 tablet; Refills: 0, Product         rekha 

      Selection Permitted                                                                         

        - Bactrim -160 mg Oral Tablet                                                        

            - take 1 tablet ORAL route every 12 hours for 7 days; 14 tablet; Refills: 0,      rekha 

      Product Selection Permitted                                                                 

Signatures:                                                                                       

Dispatcher [employer]                           Juan Botello MD MD cha Able, Lacie RN                         RN   lg3                                                  

Ronda Fan RN                    RN   vc1                                                  

                                                                                                  

**************************************************************************************************

## 2024-10-15 NOTE — ER
Nurse's Notes                                                                                     

 [facility]                                                                 

Name: Darrell Ruiz                                                                               

Age: 54 yrs                                                                                       

Sex: Male                                                                                         

: 1970                                                                                   

MRN: [id number]                                                                                   

Arrival Date: 10/15/2024                                                                          

Time: 02:30                                                                                       

Account#: [id number]                                                                            

Bed 5                                                                                             

Private MD:                                                                                       

Diagnosis: Laceration with foreign body of right hand, initial encounter-right middle finger,     

  removed                                                                                         

                                                                                                  

Presentation:                                                                                     

10/15                                                                                             

03:14 Chief complaint: Patient states: Got something in my finger laying floor. Coronavirus   vc1 

      screen: Client denies travel out of the [place] in the last 14 days. At this time, the         

      client does not indicate any symptoms associated with coronavirus-19. Ebola Screen:         

      Patient negative for fever greater than or equal to 101.5 degrees Fahrenheit, and           

      additional compatible Ebola Virus Disease symptoms Patient denies exposure to               

      infectious person. Patient denies travel to an Ebola-affected area in the 21 days           

      before illness onset. No symptoms or risks identified at this time. Initial Sepsis          

      Screen: Does the patient meet any 2 criteria? No. Patient's initial sepsis screen is        

      negative. Does the patient have a suspected source of infection? No. Patient's initial      

      sepsis screen is negative. Risk Assessment: Do you want to hurt yourself or someone         

      else? Patient reports no desire to harm self or others. Onset of symptoms was 2024 at 21:30.                                                                          

03:14 Method Of Arrival: Ambulatory                                                           vc1 

03:14 Acuity: MARY 4                                                                           vc1 

                                                                                                  

Triage Assessment:                                                                                

03:21 General: Appears in no apparent distress. uncomfortable, slender, well groomed,         vc1 

      Behavior is calm, cooperative, appropriate for age. Pain: Complains of pain in right        

      middle finger Pain does not radiate. Pain currently is 10 out of 10 on a pain scale.        

      Quality of pain is described as sharp. EENT: No deficits noted. No signs and/or             

      symptoms were reported regarding the EENT system. Neuro: Level of Consciousness is          

      awake, alert, obeys commands, Oriented to person, place, time, situation, Appropriate       

      for age. Cardiovascular: Capillary refill < 3 seconds. Respiratory: Airway is patent        

      Respiratory effort is even, unlabored, Respiratory pattern is regular, symmetrical. GI:     

      No deficits noted. No signs and/or symptoms were reported involving the                     

      gastrointestinal system. : No deficits noted. No signs and/or symptoms were reported      

      regarding the genitourinary system. Derm: Wound noted right middle finger.                  

      Musculoskeletal: Circulation, motion, and sensation intact. Injury Description: Foreign     

      body is located right middle finger.                                                        

                                                                                                  

Historical:                                                                                       

- Allergies:                                                                                      

03:16 PENICILLINS;                                                                            vc1 

- PMHx:                                                                                           

03:16 Anxiety; Back pain; carpal tunnel; chronic back pain; Chronic pain; DEGENERATIVE DISC   vc1 

      DX; Depression; insomnia; osteoarthritis; high cholesterol;                                 

- PSHx:                                                                                           

03:16 None;                                                                                   vc1 

                                                                                                  

- Immunization history:: Adult Immunizations up to date, Last tetanus immunization: <             

  10 years ago.                                                                                   

- Infectious Disease History:: Denies.                                                            

- Social history:: Smoking status: Patient reports the use of cigarette tobacco                   

  products, smokes two packs cigarettes per day.                                                  

                                                                                                  

                                                                                                  

Screenin:20 [facility] ED Fall Risk Assessment (Adult) History of falling in the last 3 months,       vc1 

      including since admission No falls in past 3 months (0 pts) Confusion or Disorientation     

      No (0 pts) Intoxicated or Sedated No (0 pts) Impaired Gait No (0 pts) Mobility Assist       

      Device Used No (0 pt) Altered Elimination No (0 pt) Score/Fall Risk Level 0 - 2 = Low       

      Risk Oriented to surroundings, Maintained a safe environment, Educated pt \T\ family on     

      fall prevention, incl call for assistance when getting out of bed. Abuse screen: Denies     

      threats or abuse. Nutritional screening: No deficits noted. Tuberculosis screening: No      

      symptoms or risk factors identified.                                                        

                                                                                                  

Assessment:                                                                                       

03:45 General: Appears in no apparent distress. comfortable, Behavior is calm, cooperative.   lg3 

      Pain: Complains of pain in right middle finger Pain does not radiate. Pain currently is     

      4 out of 10 on a pain scale. Neuro: No deficits noted. Pulido Agitation-Sedation          

      Scale (RASS): 0 - Alert and Calm Level of Consciousness is awake, alert, obeys              

      commands, Oriented to person, place, time, situation. Cardiovascular: No deficits           

      noted. Denies chest pain, shortness of breath, Capillary refill < 3 seconds Clubbing of     

      nail beds is absent JVD is absent Patient's skin is warm and dry. Respiratory: No           

      deficits noted. Airway is patent Respiratory effort is even, unlabored, Respiratory         

      pattern is regular, symmetrical. GI: No deficits noted. No signs and/or symptoms were       

      reported involving the gastrointestinal system. : No deficits noted. No signs and/or      

      symptoms were reported regarding the genitourinary system. EENT: No deficits noted. No      

      signs and/or symptoms were reported regarding the EENT system. Derm: Skin is intact, is     

      healthy with good turgor, Skin is dry, Skin is normal, Skin temperature is warm Wound       

      noted right middle finger. Musculoskeletal: No deficits noted. No signs and/or symptoms     

      reported regarding the musculoskeletal system. Circulation, motion, and sensation           

      intact. Range of motion: intact in all extremities, Swelling present in right middle        

      finger.                                                                                     

03:45 Injury Description: Puncture sustained to right middle finger.                          lg3 

                                                                                                  

Vital Signs:                                                                                      

03:14  / 76; Pulse 77; Resp 18; Temp 97.7; Pulse Ox 92% ; Weight 75.3 kg; Height 5 ft.  vc1 

      6 in. ; Pain 10/10;                                                                         

04:20  / 74; Pulse 79; Resp 16 S; Temp 97.3(O); Pulse Ox 94% on R/A;                    lg3 

03:14 Body Mass Index 26.79 (75.30 kg, 167.64 cm)                                             vc1 

03:14 Pain Scale: Adult                                                                       vc1 

                                                                                                  

ED Course:                                                                                        

02:34 Patient arrived in ED.                                                                  jj6 

02:37 Juan Dale MD is Attending Physician.                                             rekha 

03:03 Brittanie Shin RN is Primary Nurse.                                                       lg3 

03:16 Triage completed.                                                                       vc1 

03:19 Arm band placed on left wrist.                                                          vc1 

03:20 Patient has correct armband on for positive identification. Bed in low position. Call   vc1 

      light in reach. Pulse ox on. NIBP on.                                                       

03:45 Family accompanied patient.                                                             lg3 

03:45 Assist provider with foreign body removal of a splinter from right middle finger using  lg3 

      hemostats, Set up for procedure. Performed by Juan Dale MD Dressed with gauze          

      bandage, Patient tolerated well.                                                            

03:58 XRAY Hand RIGHT 3 View In Process Unspecified.                                          EDMS

04:21 Provided Education on: wound care/cleaning.                                             lg3 

04:21 Patient did not have IV access during this emergency room visit.                        lg3 

                                                                                                  

Administered Medications:                                                                         

03:48 Drug: Bupivacaine Infiltration (0.5 %) 10 ml 10 ml Infiltration once Volume: 10 ml;     lg3 

      Route: Infiltration;                                                                        

04:15 Follow up: Response: No adverse reaction                                                lg3 

03:48 Drug: Lidocaine Infiltration (1 %) 5 ml 5 ml Infiltration once; to bedside Volume: 5    lg3 

      ml; Route: Infiltration;                                                                    

04:15 Follow up: Response: No adverse reaction                                                lg3 

04:06 Drug: Trimethoprim-Sulfamethoxazole PO (160 mg-800 mg (DS) 1 tablet PO once Route: PO;  lg3 

04:14 Follow up: Response: No adverse reaction                                                lg3 

04:06 Drug: Doxycycline  mg PO once Route: PO;                                          lg3 

04:14 Follow up: Response: No adverse reaction                                                lg3 

04:07 Drug: Boostrix Tdap IM 0.5 ml IM once; as a single dose Route: IM; Site: right deltoid; lg3 

04:14 Follow up: Response: (VIS) Vaccine information sheet provided today. Questions and/or   lg3 

      concerns addressed. VIS edition date: Aug 6, 2021.; No adverse reaction                     

04:14 Drug: Mupirocin Topical Ointment 2 % 1 application Topical once Route: Topical; Site:   lg3 

      affected area;                                                                              

04:14 Follow up: Response: No adverse reaction                                                lg3 

                                                                                                  

                                                                                                  

Medication:                                                                                       

04:09 Vaccine Information Statement (VIS) provided today. Questions and/or concerns           lg3 

      addressed. VIS edition date: 2021.                                               

                                                                                                  

Outcome:                                                                                          

04:09 Discharge ordered by MD. da silva 

04:20 Discharged to home ambulatory,                                                          lg3 

04:20 Condition: stable                                                                           

04:20 Discharge instructions given to patient, Instructed on discharge instructions, follow       

      up and referral plans. medication usage, wound care, Demonstrated understanding of          

      instructions, follow-up care, medications, wound care, Prescriptions given X 4,             

04:21 Patient left the ED.                                                                    lg3 

                                                                                                  

Signatures:                                                                                       

Dispatcher [employer]                           EDMS                                                 

Juan Dale MD MD cha Able, Lacie RN                         RN   lg3                                                  

Dara [id number]                                                  

Ronda Fan RN                    RN   vc1                                                  

                                                                                                  

Corrections: (The following items were deleted from the chart)                                    

04:14 03:21 VIS not applicable for this client. vc1                                           lg3 

                                                                                                  

**************************************************************************************************

## 2024-11-04 ENCOUNTER — HOSPITAL ENCOUNTER (EMERGENCY)
Dept: HOSPITAL 97 - ER | Age: 54
LOS: 1 days | Discharge: HOME | End: 2024-11-05
Payer: COMMERCIAL

## 2024-11-04 DIAGNOSIS — R07.9: ICD-10-CM

## 2024-11-04 DIAGNOSIS — M25.511: Primary | ICD-10-CM

## 2024-11-04 DIAGNOSIS — F17.210: ICD-10-CM

## 2024-11-04 DIAGNOSIS — M79.621: ICD-10-CM

## 2024-11-04 LAB
ALBUMIN SERPL BCP-MCNC: 3.6 G/DL (ref 3.4–5)
ALBUMIN/GLOB SERPL: 1.1 {RATIO} (ref 1.1–1.8)
ALP SERPL-CCNC: 65 U/L (ref 45–117)
ALT SERPL W P-5'-P-CCNC: 17 U/L (ref 16–61)
ANION GAP SERPL CALC-SCNC: 5.8 MEQ/L (ref 5–15)
AST SERPL W P-5'-P-CCNC: < 10 U/L (ref 15–37)
BILIRUB INDIRECT SERPL-MCNC: 0.3 MG/DL (ref 0.2–0.8)
BUN BLD-MCNC: 9 MG/DL (ref 7–18)
GLOBULIN SER CALC-MCNC: 3.2 G/DL (ref 2.3–3.5)
GLUCOSE SERPLBLD-MCNC: 94 MG/DL (ref 74–106)
HCT VFR BLD CALC: 47.2 % (ref 39.6–49)
HGB BLD-MCNC: 15.9 G/DL (ref 13.6–17.9)
LYMPHOCYTES # SPEC AUTO: 2.7 K/UL (ref 0.7–4.9)
MAGNESIUM SERPL-MCNC: 2 MG/DL (ref 1.6–2.4)
MCH RBC QN AUTO: 32 PG (ref 27–35)
MCHC RBC AUTO-ENTMCNC: 33.7 G/DL (ref 32–36)
MCV RBC: 95.1 FL (ref 80–100)
NRBC # BLD: 0 10*3/UL (ref 0–0)
NRBC BLD AUTO-RTO: 0 % (ref 0–0)
PMV BLD: 7.7 FL (ref 7.6–11.3)
POTASSIUM SERPL-SCNC: 3.8 MEQ/L (ref 3.5–5.1)
RBC # BLD: 4.96 M/UL (ref 4.33–5.43)
TROPONIN I SERPL HS-MCNC: 4.6 PG/ML (ref ?–58.9)
WBC # BLD AUTO: 10 THOU/UL (ref 4.3–10.9)

## 2024-11-04 PROCEDURE — 93005 ELECTROCARDIOGRAM TRACING: CPT

## 2024-11-04 PROCEDURE — 85025 COMPLETE CBC W/AUTO DIFF WBC: CPT

## 2024-11-04 PROCEDURE — 36415 COLL VENOUS BLD VENIPUNCTURE: CPT

## 2024-11-04 PROCEDURE — 96374 THER/PROPH/DIAG INJ IV PUSH: CPT

## 2024-11-04 PROCEDURE — 71045 X-RAY EXAM CHEST 1 VIEW: CPT

## 2024-11-04 PROCEDURE — 80076 HEPATIC FUNCTION PANEL: CPT

## 2024-11-04 PROCEDURE — 96375 TX/PRO/DX INJ NEW DRUG ADDON: CPT

## 2024-11-04 PROCEDURE — 99284 EMERGENCY DEPT VISIT MOD MDM: CPT

## 2024-11-04 PROCEDURE — 80048 BASIC METABOLIC PNL TOTAL CA: CPT

## 2024-11-04 PROCEDURE — 83735 ASSAY OF MAGNESIUM: CPT

## 2024-11-04 PROCEDURE — 73060 X-RAY EXAM OF HUMERUS: CPT

## 2024-11-04 PROCEDURE — 84484 ASSAY OF TROPONIN QUANT: CPT

## 2024-11-05 VITALS — TEMPERATURE: 98 F | OXYGEN SATURATION: 96 %

## 2024-11-05 VITALS — DIASTOLIC BLOOD PRESSURE: 78 MMHG | SYSTOLIC BLOOD PRESSURE: 134 MMHG

## 2025-05-13 ENCOUNTER — HOSPITAL ENCOUNTER (EMERGENCY)
Dept: HOSPITAL 97 - ER | Age: 55
Discharge: HOME | End: 2025-05-13
Payer: COMMERCIAL

## 2025-05-13 VITALS — DIASTOLIC BLOOD PRESSURE: 84 MMHG | SYSTOLIC BLOOD PRESSURE: 128 MMHG | OXYGEN SATURATION: 99 %

## 2025-05-13 VITALS — TEMPERATURE: 97.9 F

## 2025-05-13 DIAGNOSIS — M54.50: Primary | ICD-10-CM

## 2025-05-13 DIAGNOSIS — F17.210: ICD-10-CM

## 2025-05-13 PROCEDURE — 72192 CT PELVIS W/O DYE: CPT

## 2025-05-13 PROCEDURE — 96375 TX/PRO/DX INJ NEW DRUG ADDON: CPT

## 2025-05-13 PROCEDURE — 99284 EMERGENCY DEPT VISIT MOD MDM: CPT

## 2025-05-13 PROCEDURE — 72131 CT LUMBAR SPINE W/O DYE: CPT

## 2025-05-13 PROCEDURE — 96374 THER/PROPH/DIAG INJ IV PUSH: CPT
